# Patient Record
Sex: MALE | Race: WHITE | NOT HISPANIC OR LATINO
[De-identification: names, ages, dates, MRNs, and addresses within clinical notes are randomized per-mention and may not be internally consistent; named-entity substitution may affect disease eponyms.]

---

## 2022-02-24 ENCOUNTER — NON-APPOINTMENT (OUTPATIENT)
Age: 42
End: 2022-02-24

## 2022-03-08 PROBLEM — Z00.00 ENCOUNTER FOR PREVENTIVE HEALTH EXAMINATION: Status: ACTIVE | Noted: 2022-03-08

## 2022-03-17 ENCOUNTER — APPOINTMENT (OUTPATIENT)
Dept: UROLOGY | Facility: CLINIC | Age: 42
End: 2022-03-17
Payer: COMMERCIAL

## 2022-03-17 VITALS
DIASTOLIC BLOOD PRESSURE: 78 MMHG | WEIGHT: 150 LBS | BODY MASS INDEX: 24.11 KG/M2 | SYSTOLIC BLOOD PRESSURE: 126 MMHG | HEIGHT: 66 IN | HEART RATE: 60 BPM

## 2022-03-17 DIAGNOSIS — Q55.22 RETRACTILE TESTIS: ICD-10-CM

## 2022-03-17 PROCEDURE — 99203 OFFICE O/P NEW LOW 30 MIN: CPT

## 2022-03-28 NOTE — LETTER BODY
[FreeTextEntry1] : This patient does not have a PCP. \par \par Reason for Visit: Retractile testis. 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 The patient is referred for evaluation of his condition. He complains of retractile right testicle.  He denies any testicular pain or difficulties.  The patient denies erectile dysfunction. He denies any gross hematuria or dysuria or urinary incontinence. The patient denies any aggravating or relieving factors. The patient denies any interference of function. He is entirely asymptomatic. All other review of systems are negative. He has no cancer in his family medical history. He has no previous surgical history. He is  with children. Past medical history, family history and social history were inquired and were noncontributory to current condition. Medications and allergies were reviewed. He has no known allergies to medication. \par \par On examination, the patient is a healthy-appearing gentleman in no acute distress. He is alert and oriented follows commands. He has normal mood and affect. He is normocephalic. Oral no thrush. Neck is supple. Respirations are unlabored. His abdomen is soft and nontender. Liver is nonpalpable. Bladder is nonpalpable. No CVA tenderness. Neurologically he is grossly intact. No peripheral edema. Skin without gross abnormality. He has normal male external genitalia. Normal meatus. Bilateral testes are descended intrascrotally and normal to palpation. On rectal examination, there is normal sphincter tone. The prostate is clinically benign without focal induration or nodularity.\par \par Assessment: Retractile testis. \par \par I counseled the patient. I discussed the various etiologies of his symptoms. I reassured the patient as his physical examination today was normal. Both testes lie normally and are normal in size. If symptoms recur, I encouraged the patient to take a photo. Patient understands that if he develops gross hematuria or any urinary discomfort, he will contact me for further evaluation.  Risks and alternatives were discussed. I answered the patient questions. The patient will follow-up as directed and will contact me with any questions or concerns. Thank you for the opportunity to participate in the care of this patient. I'll keep you updated on his progress.\par \par Plan: Follow up in 1 year.

## 2022-03-28 NOTE — HISTORY OF PRESENT ILLNESS
[FreeTextEntry1] : Please refer to URO Consult note \par \par Patient is   He complains of retractile right testicle.  He denies any pain or difficulties.  He is  with children.  He denies erectile dysfunction.\par Normal male exam.\par Both testes have lie  and normal lay in size.\par Reassured patient.  Patient has retractile testis.  Encouraged him to obtain photo if his symptoms recur.  Or has any pain.

## 2022-03-28 NOTE — ADDENDUM
[FreeTextEntry1] : Entered by Bismark Mitchell, acting as scribe for Dr. Twan Starr.\par \par The documentation recorded by the scribe accurately reflects the service I personally performed and the decisions made by me.